# Patient Record
Sex: FEMALE | Race: BLACK OR AFRICAN AMERICAN | Employment: STUDENT | URBAN - METROPOLITAN AREA
[De-identification: names, ages, dates, MRNs, and addresses within clinical notes are randomized per-mention and may not be internally consistent; named-entity substitution may affect disease eponyms.]

---
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
## 2020-02-16 ENCOUNTER — HEALTH MAINTENANCE LETTER (OUTPATIENT)
Age: 20
End: 2020-02-16

## 2020-02-17 NOTE — PATIENT INSTRUCTIONS
General Scheduling Information  To schedule your CT/MRI scan, please contact [place] at [phone]   [address] [place] [address]    To schedule your Surgery, please contact our Specialty Schedulers at [phone]    ENT Clinic Locations Clinic Hours Telephone Number     [facility]  [address] [place] [address]   Tuesday:       8:00am -- 4:00pm    Wednesday:  8:00am - 4:00pm   To schedule an appointment with   Dr. Amezcua,   please contact our   Specialty Scheduling Department at:     [phone]       [facility]  [address] [place] [address]   Friday:          8:00am - 4:00pm         Urgent Care Locations Clinic Hours Telephone Numbers     [facility]  [address] [place] [address]     Monday-Friday:     11:00pm - 9:00pm    Saturday-Sunday:  9:00am - 5:00pm   [phone]     [facility]  [address] [place] [address]     Monday-Friday:      5:00pm - 9:00pm     Saturday-Sunday:  9:00am - 5:00pm   [phone]       
No

## 2020-09-03 ENCOUNTER — TRANSFERRED RECORDS (OUTPATIENT)
Dept: HEALTH INFORMATION MANAGEMENT | Facility: CLINIC | Age: 20
End: 2020-09-03

## 2020-09-14 ENCOUNTER — MEDICAL CORRESPONDENCE (OUTPATIENT)
Dept: HEALTH INFORMATION MANAGEMENT | Facility: CLINIC | Age: 20
End: 2020-09-14

## 2020-09-18 ENCOUNTER — TELEPHONE (OUTPATIENT)
Dept: ORTHOPEDICS | Facility: CLINIC | Age: 20
End: 2020-09-18

## 2020-09-18 ENCOUNTER — TRANSCRIBE ORDERS (OUTPATIENT)
Dept: OTHER | Age: 20
End: 2020-09-18

## 2020-09-18 DIAGNOSIS — R22.31 MASS OF RIGHT UPPER EXTREMITY: Primary | ICD-10-CM

## 2020-09-18 NOTE — TELEPHONE ENCOUNTER
Called pt back. She did not like the time offered for next week and asked to be pushed out another week.     Scheduled.       Brooke

## 2020-09-18 NOTE — TELEPHONE ENCOUNTER
[facility]    Phone Message    May a detailed message be left on voicemail: yes     Reason for Call: Appointment Intake    Referring Provider Name: Dylan Cabrales CNP  Diagnosis and/or Symptoms: Mass of right upper extremity    Action Taken: Message routed to:  [facility] ([facility] 1st available opening is 9/23/20.  Please triage and help pt to get scheduled appropriately    Travel Screening: Not Applicable

## 2020-09-21 NOTE — TELEPHONE ENCOUNTER
RECORDS RECEIVED FROM: Right upper extremity mass   DATE RECEIVED: Oct 2, 2020     NOTES STATUS DETAILS   OFFICE NOTE from referring provider Care Everywhere    OFFICE NOTE from other specialist N/A    DISCHARGE SUMMARY from hospital N/A    DISCHARGE REPORT from the ER N/A    OPERATIVE REPORT N/A    MEDICATION LIST Internal    IMPLANT RECORD/STICKER N/A    LABS     CBC/DIFF N/A    CULTURES N/A    INJECTIONS DONE IN RADIOLOGY N/A    MRI In process    CT SCAN N/A    XRAYS (IMAGES & REPORTS) N/A    TUMOR     PATHOLOGY  Slides & report N/A    09/21/20   10:59 AM   [facility], the images are at [facility], called [facility] they will push images  Malinda Newman CMA

## 2020-10-02 ENCOUNTER — PRE VISIT (OUTPATIENT)
Dept: ORTHOPEDICS | Facility: CLINIC | Age: 20
End: 2020-10-02

## 2020-10-02 ENCOUNTER — OFFICE VISIT (OUTPATIENT)
Dept: ORTHOPEDICS | Facility: CLINIC | Age: 20
End: 2020-10-02
Payer: COMMERCIAL

## 2020-10-02 VITALS — HEIGHT: 69 IN | WEIGHT: 174 LBS | BODY MASS INDEX: 25.77 KG/M2

## 2020-10-02 DIAGNOSIS — R22.31 MASS OF RIGHT UPPER EXTREMITY: ICD-10-CM

## 2020-10-02 DIAGNOSIS — Z11.59 ENCOUNTER FOR SCREENING FOR OTHER VIRAL DISEASES: Primary | ICD-10-CM

## 2020-10-02 PROCEDURE — 99201 PR OFFICE/OUTPT VISIT, NEW, LEVEL I: CPT | Performed by: ORTHOPAEDIC SURGERY

## 2020-10-02 ASSESSMENT — PATIENT HEALTH QUESTIONNAIRE - PHQ9: SUM OF ALL RESPONSES TO PHQ QUESTIONS 1-9: 9

## 2020-10-02 ASSESSMENT — MIFFLIN-ST. JEOR: SCORE: 1613.64

## 2020-10-02 NOTE — PROGRESS NOTES
This is a 20-year-old noticed a right forearm mass in the subcutaneous border the ulna about 4 months ago.  Is gotten larger since she first noticed it.  It is giving her some mild discomfort.  It is bothersome when she is resting her arm on a desk while using her hand to write or type.  She is right-handed.  Its size can fluctuate during the day with it being smallest in the morning.  She is interested in having this removed.  The pain does not radiate and is not associated with any numbness or tingling.    On examination she is alert oriented has a normal mood and affect and is in no acute distress.  Respirations are regular and unlabored eyes are nonicteric.  In her right upper extremity is no edema or erythema.  She has a small firm fixed mass over the subcutaneous border of the right ulna.  Motion of the shoulder elbow wrist and hand are within normal limits.  The hand is well perfused and sensate.    Review the patient's MRI.  The mass is rather indistinct and abutting the ulna within the subcutaneous tissues.  The bone is not involved nor is the skin.  This could be nodular fasciitis or another reactive condition.  It seems to have edema and indistinct boundary or transition between the lesion and normal tissue.    Our plan will be to do an excision of this mass.  The patient understands the risks of bleeding infection and pain.  I have answered all their questions.

## 2020-10-02 NOTE — NURSING NOTE
"Reason For Visit:   Chief Complaint   Patient presents with     Consult     consulting right forearm mass // noticed lump about 4 months ago and slowly growing per pt // also noticed another lump about 1 month ago        Ht 1.753 m (5' 9\")   Wt 78.9 kg (174 lb)   BMI 25.70 kg/m      Pain Assessment  Patient Currently in Pain: Yes  0-10 Pain Scale: 4(only with pressure per)  Primary Pain Location: Arm(right forearm)    Scarlet Gomes ATC    "

## 2020-10-02 NOTE — LETTER
10/2/2020         RE: Shana Ji  [address]  [place] [address]        Dear Colleague,    Thank you for referring your patient, Shana Ji, to the [facility] [place]. Please see a copy of my visit note below.    This is a 20-year-old noticed a right forearm mass in the subcutaneous border the ulna about 4 months ago.  Is gotten larger since she first noticed it.  It is giving her some mild discomfort.  It is bothersome when she is resting her arm on a desk while using her hand to write or type.  She is right-handed.  Its size can fluctuate during the day with it being smallest in the morning.  She is interested in having this removed.  The pain does not radiate and is not associated with any numbness or tingling.    On examination she is alert oriented has a normal mood and affect and is in no acute distress.  Respirations are regular and unlabored eyes are nonicteric.  In her right upper extremity is no edema or erythema.  She has a small firm fixed mass over the subcutaneous border of the right ulna.  Motion of the shoulder elbow wrist and hand are within normal limits.  The hand is well perfused and sensate.    Review the patient's MRI.  The mass is rather indistinct and abutting the ulna within the subcutaneous tissues.  The bone is not involved nor is the skin.  This could be nodular fasciitis or another reactive condition.  It seems to have edema and indistinct boundary or transition between the lesion and normal tissue.    Our plan will be to do an excision of this mass.  The patient understands the risks of bleeding infection and pain.  I have answered all their questions.      Sincerely,        Irineo Aparicio MD

## 2020-10-07 ENCOUNTER — ANESTHESIA EVENT (OUTPATIENT)
Dept: SURGERY | Facility: AMBULATORY SURGERY CENTER | Age: 20
End: 2020-10-07

## 2020-10-07 NOTE — ANESTHESIA PREPROCEDURE EVALUATION
"Anesthesia Pre-Procedure Evaluation    Patient: Shana Ji   MRN:     [id number] Gender:   female   Age:    20 year old :      2000        Preoperative Diagnosis: Mass of right upper extremity [R22.31]   Procedure(s):  Excision right forearm mass     LABS:  CBC:   Lab Results   Component Value Date    WBC 6.2 2016    WBC 5.5 2015    HGB 11.2 (L) 2016    HGB 12.0 2015    HCT 35.0 2016    HCT 36.7 2015     2016     2015     BMP:   Lab Results   Component Value Date     2016     2015    POTASSIUM 3.6 2016    POTASSIUM 4.0 2015    CHLORIDE 104 2016    CHLORIDE 105 2015    CO2 27 2016    CO2 30 2015    BUN 13 2016    BUN 9 2015    CR 0.81 2016    CR 0.75 2015    GLC 94 2016     (H) 2015     COAGS: No results found for: PTT, INR, FIBR  POC: No results found for: BGM, HCG, HCGS  OTHER:   Lab Results   Component Value Date    KIMBERLYN 8.7 (L) 2016    ALBUMIN 4.3 2016    PROTTOTAL 7.7 2016    ALT 13 2016    AST 12 2016    ALKPHOS 86 2016    BILITOTAL 0.4 2016    LIPASE 113 2015    TSH 1.50 2016        Preop Vitals    BP Readings from Last 3 Encounters:   17 124/83   16 109/68   10/07/16 102/71    Pulse Readings from Last 3 Encounters:   16 76   10/07/16 62   16 77      Resp Readings from Last 3 Encounters:   17 16   17 9   16 16    SpO2 Readings from Last 3 Encounters:   17 99%   10/07/16 98%   16 100%      Temp Readings from Last 1 Encounters:   17 36.3  C (97.4  F) (Temporal)    Ht Readings from Last 1 Encounters:   10/02/20 1.753 m (5' 9\")      Wt Readings from Last 1 Encounters:   10/02/20 78.9 kg (174 lb)    Estimated body mass index is 25.7 kg/m  as calculated from the following:    Height as of 10/2/20: 1.753 m (5' 9\").    Weight as of " 10/2/20: 78.9 kg (174 lb).     LDA:        Past Medical History:   Diagnosis Date     Depressive disorder       Past Surgical History:   Procedure Laterality Date     CLOSED REDUCTION NOSE N/A 4/16/2015    Procedure: CLOSED REDUCTION NOSE;  Surgeon: Jamaal Tracey MD;  Location: [facility] OR     TONSILLECTOMY N/A 1/16/2017    Procedure: TONSILLECTOMY;  Surgeon: Dax Amezcua MD;  Location: [facility] OR      No Known Allergies     Anesthesia Evaluation     .             ROS/MED HX    ENT/Pulmonary:      (-) CHRISTIAN risk factors   Neurologic:       Cardiovascular:         METS/Exercise Tolerance:     Hematologic:         Musculoskeletal:         GI/Hepatic:         Renal/Genitourinary:         Endo:         Psychiatric:         Infectious Disease:         Malignancy:         Other:                         PHYSICAL EXAM:   Mental Status/Neuro: A/A/O   Airway: Facies: Feasible  Mallampati: I  Mouth/Opening: Full  TM distance: > 6 cm  Neck ROM: Full   Respiratory:   Resp. Rate: Normal     Resp. Effort: Normal      CV:   Rate: Age appropriate  Edema: None   Comments:      Dental: Normal Dentition                Assessment:   ASA SCORE: 1    H&P: History and physical reviewed and following examination; no interval change.    NPO Status: NPO Appropriate     Plan:   Anes. Type:  MAC   Pre-Medication: None   Induction:  N/a   Airway: Native Airway   Access/Monitoring: PIV   Maintenance: N/a     Postop Plan:   Postop Pain: None  Postop Sedation/Airway: Not planned  Disposition: Outpatient     PONV Management: Adult Risk Factors: Female   Prevention: Ondansetron, Dexamethasone     CONSENT: Direct conversation   Plan and risks discussed with: Patient                      Juma Almeida MD     ANESTHESIA PREOP EVALUATION    PROCEDURE: Procedure(s):  Excision right forearm mass    HPI: Shana Ji is a 20 year old female who presents for above procedure.    PAST MEDICAL HISTORY:    Past Medical History:   Diagnosis Date     Depressive  disorder        PAST SURGICAL HISTORY:    Past Surgical History:   Procedure Laterality Date     CLOSED REDUCTION NOSE N/A 4/16/2015    Procedure: CLOSED REDUCTION NOSE;  Surgeon: Jamaal Tracey MD;  Location: [facility]     TONSILLECTOMY N/A 1/16/2017    Procedure: TONSILLECTOMY;  Surgeon: Dax Amezcua MD;  Location: [facility] OR       SOCIAL HISTORY:       Social History     Tobacco Use     Smoking status: Never Smoker     Smokeless tobacco: Never Used   Substance Use Topics     Alcohol use: No       ALLERGIES:     No Known Allergies    MEDICATIONS:     (Not in a hospital admission)      Current Outpatient Medications   Medication Sig Dispense Refill     Iron TABS Take 65 mg by mouth daily        Melatonin 10 MG TABS Take 10 mg by mouth Per pt take 4 tabs daily       ondansetron (ZOFRAN ODT) 4 MG ODT tab Take 1 tablet (4 mg) by mouth every 6 hours as needed for nausea or vomiting (Patient not taking: Reported on 10/2/2020) 20 tablet 1     oxyCODONE (ROXICODONE) 5 MG/5ML solution Take 5-10 mLs (5-10 mg) by mouth every 4 hours as needed for moderate to severe pain (Patient not taking: Reported on 10/2/2020) 700 mL 0     sertraline (ZOLOFT) 50 MG tablet Take 11/2 pill daily (Patient not taking: Reported on 10/2/2020) 135 tablet 1     UNKNOWN TO PATIENT Birth control pills         Current Outpatient Medications Ordered in Epic   Medication Sig Dispense Refill     Iron TABS Take 65 mg by mouth daily        Melatonin 10 MG TABS Take 10 mg by mouth Per pt take 4 tabs daily       ondansetron (ZOFRAN ODT) 4 MG ODT tab Take 1 tablet (4 mg) by mouth every 6 hours as needed for nausea or vomiting (Patient not taking: Reported on 10/2/2020) 20 tablet 1     oxyCODONE (ROXICODONE) 5 MG/5ML solution Take 5-10 mLs (5-10 mg) by mouth every 4 hours as needed for moderate to severe pain (Patient not taking: Reported on 10/2/2020) 700 mL 0     sertraline (ZOLOFT) 50 MG tablet Take 11/2 pill daily (Patient not taking: Reported on  10/2/2020) 135 tablet 1     UNKNOWN TO PATIENT Birth control pills       No current Epic-ordered facility-administered medications on file.        PHYSICAL EXAM:    Vitals: T Data Unavailable, P Data Unavailable, BP Data Unavailable, R Data Unavailable, SpO2  , Weight   Wt Readings from Last 2 Encounters:   10/02/20 78.9 kg (174 lb)   02/17/17 81.2 kg (179 lb) (95 %, Z= 1.64)*     * Growth percentiles are based on [facility] (Girls, 2-20 Years) data.       See doc flowsheet    NPO STATUS: see doc flowsheet    LABS:    BMP:  Recent Labs   Lab Test 02/19/16  1427      POTASSIUM 3.6   CHLORIDE 104   CO2 27   BUN 13   CR 0.81   GLC 94   KIMBERLYN 8.7*       LFTs:   Recent Labs   Lab Test 02/19/16  1427   PROTTOTAL 7.7   ALBUMIN 4.3   BILITOTAL 0.4   ALKPHOS 86   AST 12   ALT 13       CBC:   Recent Labs   Lab Test 02/19/16  1427   WBC 6.2   RBC 4.16   HGB 11.2*   HCT 35.0   MCV 84   MCH 26.9   MCHC 32.0   RDW 15.1*          Coags:  No results for input(s): INR, PTT, FIBR in the last 76946 hours.    Imaging:  No orders to display       Juma Almeida MD  Anesthesiology Staff  Pager [phone]    10/7/2020  2:20 PM

## 2020-10-08 ENCOUNTER — HOSPITAL ENCOUNTER (OUTPATIENT)
Facility: AMBULATORY SURGERY CENTER | Age: 20
Discharge: HOME | End: 2020-10-08
Attending: ORTHOPAEDIC SURGERY | Admitting: ORTHOPAEDIC SURGERY
Payer: COMMERCIAL

## 2020-10-08 ENCOUNTER — ANESTHESIA (OUTPATIENT)
Dept: SURGERY | Facility: AMBULATORY SURGERY CENTER | Age: 20
End: 2020-10-08

## 2020-10-08 VITALS
WEIGHT: 175 LBS | SYSTOLIC BLOOD PRESSURE: 114 MMHG | DIASTOLIC BLOOD PRESSURE: 71 MMHG | HEART RATE: 74 BPM | RESPIRATION RATE: 16 BRPM | BODY MASS INDEX: 25.05 KG/M2 | HEIGHT: 70 IN | OXYGEN SATURATION: 100 % | TEMPERATURE: 98.3 F

## 2020-10-08 DIAGNOSIS — R22.31 MASS OF RIGHT UPPER EXTREMITY: Primary | ICD-10-CM

## 2020-10-08 LAB
HCG UR QL: NEGATIVE
INTERNAL QC OK POCT: YES

## 2020-10-08 PROCEDURE — 88307 TISSUE EXAM BY PATHOLOGIST: CPT | Mod: GC | Performed by: PATHOLOGY

## 2020-10-08 PROCEDURE — 81025 URINE PREGNANCY TEST: CPT | Performed by: PATHOLOGY

## 2020-10-08 PROCEDURE — 25076 EXC FOREARM TUM DEEP < 3 CM: CPT | Mod: RT,GC | Performed by: ORTHOPAEDIC SURGERY

## 2020-10-08 PROCEDURE — 88342 IMHCHEM/IMCYTCHM 1ST ANTB: CPT | Mod: GC | Performed by: PATHOLOGY

## 2020-10-08 PROCEDURE — 25076 EXC FOREARM TUM DEEP < 3 CM: CPT | Mod: RT

## 2020-10-08 PROCEDURE — 88341 IMHCHEM/IMCYTCHM EA ADD ANTB: CPT | Mod: GC | Performed by: PATHOLOGY

## 2020-10-08 PROCEDURE — 88312 SPECIAL STAINS GROUP 1: CPT | Mod: GC | Performed by: PATHOLOGY

## 2020-10-08 RX ORDER — DEXAMETHASONE SODIUM PHOSPHATE 4 MG/ML
INJECTION, SOLUTION INTRA-ARTICULAR; INTRALESIONAL; INTRAMUSCULAR; INTRAVENOUS; SOFT TISSUE PRN
Status: DISCONTINUED | OUTPATIENT
Start: 2020-10-08 | End: 2020-10-08

## 2020-10-08 RX ORDER — LIDOCAINE 40 MG/G
CREAM TOPICAL
Status: DISCONTINUED | OUTPATIENT
Start: 2020-10-08 | End: 2020-10-08 | Stop reason: HOSPADM

## 2020-10-08 RX ORDER — ACETAMINOPHEN 325 MG/1
975 TABLET ORAL ONCE
Status: COMPLETED | OUTPATIENT
Start: 2020-10-08 | End: 2020-10-08

## 2020-10-08 RX ORDER — KETOROLAC TROMETHAMINE 30 MG/ML
INJECTION, SOLUTION INTRAMUSCULAR; INTRAVENOUS PRN
Status: DISCONTINUED | OUTPATIENT
Start: 2020-10-08 | End: 2020-10-08

## 2020-10-08 RX ORDER — KETOROLAC TROMETHAMINE 30 MG/ML
30 INJECTION, SOLUTION INTRAMUSCULAR; INTRAVENOUS EVERY 6 HOURS PRN
Status: DISCONTINUED | OUTPATIENT
Start: 2020-10-08 | End: 2020-10-09 | Stop reason: HOSPADM

## 2020-10-08 RX ORDER — FENTANYL CITRATE 50 UG/ML
25-50 INJECTION, SOLUTION INTRAMUSCULAR; INTRAVENOUS
Status: DISCONTINUED | OUTPATIENT
Start: 2020-10-08 | End: 2020-10-08 | Stop reason: HOSPADM

## 2020-10-08 RX ORDER — LIDOCAINE HYDROCHLORIDE 20 MG/ML
INJECTION, SOLUTION INFILTRATION; PERINEURAL PRN
Status: DISCONTINUED | OUTPATIENT
Start: 2020-10-08 | End: 2020-10-08

## 2020-10-08 RX ORDER — NALOXONE HYDROCHLORIDE 0.4 MG/ML
.1-.4 INJECTION, SOLUTION INTRAMUSCULAR; INTRAVENOUS; SUBCUTANEOUS
Status: DISCONTINUED | OUTPATIENT
Start: 2020-10-08 | End: 2020-10-09 | Stop reason: HOSPADM

## 2020-10-08 RX ORDER — OXYCODONE HYDROCHLORIDE 5 MG/1
5 TABLET ORAL EVERY 6 HOURS PRN
Qty: 12 TABLET | Refills: 0 | Status: SHIPPED | OUTPATIENT
Start: 2020-10-08 | End: 2020-10-11

## 2020-10-08 RX ORDER — BUPIVACAINE HYDROCHLORIDE 2.5 MG/ML
INJECTION, SOLUTION INFILTRATION; PERINEURAL PRN
Status: DISCONTINUED | OUTPATIENT
Start: 2020-10-08 | End: 2020-10-08 | Stop reason: HOSPADM

## 2020-10-08 RX ORDER — MEPERIDINE HYDROCHLORIDE 25 MG/ML
12.5 INJECTION INTRAMUSCULAR; INTRAVENOUS; SUBCUTANEOUS
Status: DISCONTINUED | OUTPATIENT
Start: 2020-10-08 | End: 2020-10-09 | Stop reason: HOSPADM

## 2020-10-08 RX ORDER — SODIUM CHLORIDE, SODIUM LACTATE, POTASSIUM CHLORIDE, CALCIUM CHLORIDE 600; 310; 30; 20 MG/100ML; MG/100ML; MG/100ML; MG/100ML
INJECTION, SOLUTION INTRAVENOUS CONTINUOUS
Status: DISCONTINUED | OUTPATIENT
Start: 2020-10-08 | End: 2020-10-08 | Stop reason: HOSPADM

## 2020-10-08 RX ORDER — CEFAZOLIN SODIUM 1 G/50ML
1 SOLUTION INTRAVENOUS SEE ADMIN INSTRUCTIONS
Status: DISCONTINUED | OUTPATIENT
Start: 2020-10-08 | End: 2020-10-08 | Stop reason: HOSPADM

## 2020-10-08 RX ORDER — PROPOFOL 10 MG/ML
INJECTION, EMULSION INTRAVENOUS CONTINUOUS PRN
Status: DISCONTINUED | OUTPATIENT
Start: 2020-10-08 | End: 2020-10-08

## 2020-10-08 RX ORDER — PROPOFOL 10 MG/ML
INJECTION, EMULSION INTRAVENOUS PRN
Status: DISCONTINUED | OUTPATIENT
Start: 2020-10-08 | End: 2020-10-08

## 2020-10-08 RX ORDER — ONDANSETRON 2 MG/ML
4 INJECTION INTRAMUSCULAR; INTRAVENOUS EVERY 30 MIN PRN
Status: DISCONTINUED | OUTPATIENT
Start: 2020-10-08 | End: 2020-10-09 | Stop reason: HOSPADM

## 2020-10-08 RX ORDER — CEFAZOLIN SODIUM 2 G/50ML
2 SOLUTION INTRAVENOUS
Status: COMPLETED | OUTPATIENT
Start: 2020-10-08 | End: 2020-10-08

## 2020-10-08 RX ORDER — SODIUM CHLORIDE, SODIUM LACTATE, POTASSIUM CHLORIDE, CALCIUM CHLORIDE 600; 310; 30; 20 MG/100ML; MG/100ML; MG/100ML; MG/100ML
INJECTION, SOLUTION INTRAVENOUS CONTINUOUS
Status: DISCONTINUED | OUTPATIENT
Start: 2020-10-08 | End: 2020-10-09 | Stop reason: HOSPADM

## 2020-10-08 RX ORDER — OXYCODONE HYDROCHLORIDE 5 MG/1
5 TABLET ORAL EVERY 4 HOURS PRN
Status: DISCONTINUED | OUTPATIENT
Start: 2020-10-08 | End: 2020-10-09 | Stop reason: HOSPADM

## 2020-10-08 RX ORDER — ONDANSETRON 4 MG/1
4 TABLET, ORALLY DISINTEGRATING ORAL EVERY 30 MIN PRN
Status: DISCONTINUED | OUTPATIENT
Start: 2020-10-08 | End: 2020-10-09 | Stop reason: HOSPADM

## 2020-10-08 RX ORDER — FENTANYL CITRATE 50 UG/ML
INJECTION, SOLUTION INTRAMUSCULAR; INTRAVENOUS PRN
Status: DISCONTINUED | OUTPATIENT
Start: 2020-10-08 | End: 2020-10-08

## 2020-10-08 RX ORDER — ONDANSETRON 2 MG/ML
INJECTION INTRAMUSCULAR; INTRAVENOUS PRN
Status: DISCONTINUED | OUTPATIENT
Start: 2020-10-08 | End: 2020-10-08

## 2020-10-08 RX ORDER — GABAPENTIN 300 MG/1
300 CAPSULE ORAL ONCE
Status: COMPLETED | OUTPATIENT
Start: 2020-10-08 | End: 2020-10-08

## 2020-10-08 RX ADMIN — GABAPENTIN 300 MG: 300 CAPSULE ORAL at 07:43

## 2020-10-08 RX ADMIN — FENTANYL CITRATE 25 MCG: 50 INJECTION, SOLUTION INTRAMUSCULAR; INTRAVENOUS at 09:32

## 2020-10-08 RX ADMIN — KETOROLAC TROMETHAMINE 15 MG: 30 INJECTION, SOLUTION INTRAMUSCULAR; INTRAVENOUS at 09:55

## 2020-10-08 RX ADMIN — LIDOCAINE HYDROCHLORIDE 50 MG: 20 INJECTION, SOLUTION INFILTRATION; PERINEURAL at 09:29

## 2020-10-08 RX ADMIN — CEFAZOLIN SODIUM 2 G: 2 SOLUTION INTRAVENOUS at 09:33

## 2020-10-08 RX ADMIN — PROPOFOL 30 MG: 10 INJECTION, EMULSION INTRAVENOUS at 09:41

## 2020-10-08 RX ADMIN — FENTANYL CITRATE 25 MCG: 50 INJECTION, SOLUTION INTRAMUSCULAR; INTRAVENOUS at 09:42

## 2020-10-08 RX ADMIN — ACETAMINOPHEN 975 MG: 325 TABLET ORAL at 07:43

## 2020-10-08 RX ADMIN — ONDANSETRON 4 MG: 2 INJECTION INTRAMUSCULAR; INTRAVENOUS at 09:35

## 2020-10-08 RX ADMIN — PROPOFOL 60 MG: 10 INJECTION, EMULSION INTRAVENOUS at 09:29

## 2020-10-08 RX ADMIN — SODIUM CHLORIDE, SODIUM LACTATE, POTASSIUM CHLORIDE, CALCIUM CHLORIDE: 600; 310; 30; 20 INJECTION, SOLUTION INTRAVENOUS at 07:49

## 2020-10-08 RX ADMIN — PROPOFOL 150 MCG/KG/MIN: 10 INJECTION, EMULSION INTRAVENOUS at 09:30

## 2020-10-08 RX ADMIN — DEXAMETHASONE SODIUM PHOSPHATE 4 MG: 4 INJECTION, SOLUTION INTRA-ARTICULAR; INTRALESIONAL; INTRAMUSCULAR; INTRAVENOUS; SOFT TISSUE at 09:31

## 2020-10-08 ASSESSMENT — MIFFLIN-ST. JEOR: SCORE: 1634.04

## 2020-10-08 NOTE — ANESTHESIA POSTPROCEDURE EVALUATION
Anesthesia POST Procedure Evaluation    Patient: Shana Ji   MRN:     [id number] Gender:   female   Age:    20 year old :      2000        Preoperative Diagnosis: Mass of right upper extremity [R22.31]   Procedure(s):  Excision right forearm mass   Postop Comments: No value filed.     Anesthesia Type: MAC       Disposition: Outpatient   Postop Pain Control: Uneventful            Sign Out: Well controlled pain   PONV: No   Neuro/Psych: Uneventful            Sign Out: Acceptable/Baseline neuro status   Airway/Respiratory: Uneventful            Sign Out: Acceptable/Baseline resp. status   CV/Hemodynamics: Uneventful            Sign Out: Acceptable CV status   Other NRE: NONE   DID A NON-ROUTINE EVENT OCCUR? No         Last Anesthesia Record Vitals:  CRNA VITALS  10/8/2020 0936 - 10/8/2020 1036      10/8/2020             Pulse:  69    SpO2:  98 %    Resp Rate (set):  10          Last PACU Vitals:  No vitals data found for the desired time range.        Electronically Signed By: Juma Almeida MD, 2020, 1:24 PM

## 2020-10-08 NOTE — ANESTHESIA CARE TRANSFER NOTE
Patient: Shana Lambert    Procedure(s):  Excision right forearm mass    Diagnosis: Mass of right upper extremity [R22.31]  Diagnosis Additional Information: No value filed.    Anesthesia Type:   MAC     Note:  Airway :Room Air  Patient transferred to:Phase II  Comments: 131/76  HR: 73  SpO2: 98%  RR: 13  T: 99.6    Report to RN. Handoff Report: Identifed the Patient, Identified the Reponsible Provider, Reviewed the pertinent medical history, Discussed the surgical course, Reviewed Intra-OP anesthesia mangement and issues during anesthesia, Set expectations for post-procedure period and Allowed opportunity for questions and acknowledgement of understanding      Vitals: (Last set prior to Anesthesia Care Transfer)    CRNA VITALS  10/8/2020 0936 - 10/8/2020 1012      10/8/2020             Pulse:  69    SpO2:  98 %    Resp Rate (set):  10                Electronically Signed By: VICKY Edwards CRNA  October 8, 2020  10:12 AM

## 2020-10-08 NOTE — OP NOTE
DATE OF SURGERY: 10/8/2020    PREOPERATIVE DIAGNOSIS: Right forearm mass    POSTOPERATIVE DIAGNOSIS: Right forearm mass    PROCEDURE: Excision right forearm mass, 2.5 cm    SURGEON: Irineo Aparicio MD     ASSISTANT: Mk Gunn MD    PATIENT HISTORY: This patient has a mass on her right forearm that has not gone away after many months.  It is bothersome when she rests her arm on something.  She understands the risks of bleeding infection and pain.  Examining the mass that it is fixed to the underlying fascia.  However mostly involves the subcutaneous fat.    DESCRIPTION OF PROCEDURE: The patient was placed supine and underwent successful induction of anesthesia.  The right arm was washed and sterilely prepped and draped.  I made an incision over the arm sharply through the skin divided subcutaneous tissues with cautery until we came down to this dense connective tissue that formed a mass.  I  this with cautery from the surrounding subcutaneous tissue and took a little bit of the deep fascia with this.  I did not take periosteum but was able to  from the periosteum.  It was fairly adherent to the periosteal tissues however.  After we excised it it was sent to pathology in formalin.  We copiously irrigated and closed with Vicryl to subcutaneous tissue Monocryl in the skin Steri-Strips and a sterile dry dressing.  The patient was then taken to recovery room in stable condition.  The estimated blood loss is 2 mL.  There were no complications.  I was present for all critical portions of the procedure.    Irineo Aparicio MD

## 2020-10-08 NOTE — DISCHARGE INSTRUCTIONS
"[facility]  Home Care Following Anesthesia  For 24 hours after surgery:  1. Get plenty of rest.  A responsible adult must stay with you for at least 24 hours after you leave the surgery center.  2. Do not drive or use heavy equipment.  If you have weakness or tingling, don't drive or use heavy equipment until this feeling goes away.   3. Do not drink alcohol.   4. Avoid strenuous or risky activities.  Ask for help when climbing stairs.  5. You may feel lightheaded.  IF so, sit for a few minutes before standing.  Have someone help you get up.   6. If you have nausea (feel sick to your stomach): Drink only clear liquids such as apple juice, ginger ale, broth or 7-Up.  Rest may also help.  Be sure to drink enough fluids.  Move to a regular diet as you feel able.   7. You may have a slight fever.  Call the doctor if your fever is over 100 F (37.7 C) (taken under the tongue) or lasts longer than 24 hours.  8. You may have a dry mouth, a sore throat, muscle aches or trouble sleeping. These should go away after 24 hours.  9. Do not make important or legal decisions.        Today you received a Marcaine or bupivacaine block to numb the nerves near your surgery site.  This is a block using local anesthetic or \"numbing\" medication injected around the nerves to anesthetize or \"numb\" the area supplied by those nerves.  This block is injected into the muscle layer near your surgical site.  The medication may numb the location where you had surgery for 6-18 hours, but may last up to 24 hours.  If your surgical site is an arm or leg you should be careful with your affected limb, since it is possible to injure your limb without being aware of it due to the numbing.  Until full feeling returns, you should guard against bumping or hitting your limb, and avoid extreme hot or cold temperatures on the skin.  As the block wears off, the feeling will return as a tingling or prickly sensation near your " surgical site.  You will experience more discomfort from your incision as the feeling returns.  You may want to take a pain pill (a narcotic or Tylenol if this was prescribed by your surgeon) when you start to experience mild pain before the pain beccomes more severe.  If your pain medications do not control your pain you should notifiy your surgeon.    Tips for taking pain medications  To get the best pain relief possible, remember these points:    Take pain medications as directed, before pain becomes severe.    Pain medication can upset your stomach: taking it with food may help.    Constipation is a common side effect of pain medication. Drink plenty of  fluids.    Eat foods high in fiber. Take a stool softener if recommended by your doctor or pharmacist.    Do not drink alcohol, drive or operate machinery while taking pain medications.    Ask about other ways to control pain, such as with heat, ice or relaxation.    Tylenol/Acetaminophen Consumption  To help encourage the safe use of acetaminophen, the makers of TYLENOL  have lowered the maximum daily dose for single-ingredient Extra Strength TYLENOL  (acetaminophen) products sold in the [place] from 8 pills per day (4,000 mg) to 6 pills per day (3,000 mg). The dosing interval has also changed from 2 pills every 4-6 hours to 2 pills every 6 hours.    If you feel your pain relief is insufficient, you may take Tylenol/Acetaminophen in addition to your narcotic pain medication.     Be careful not to exceed 3,000 mg of Tylenol/Acetaminophen in a 24 hour period from all sources.    If you are taking extra strength Tylenol/acetaminophen (500 mg), the maximum dose is 6 tablets in 24 hours.    If you are taking regular strength acetaminophen (325 mg), the maximum dose is 9 tablets in 24 hours.    Tylenol 975mg given at 7:43am. Next dose available after 1:45pm. Then follow package instructions.     Call a doctor for any of the followin. Signs of infection (fever,  growing tenderness at the surgery site, a large amount of drainage or bleeding, severe pain, foul-smelling drainage, redness, swelling).  2. It has been over 8 to 10 hours since surgery and you are still not able to urinate (pass water).  3. Headache for over 24 hours.  4. Numbness, tingling or weakness the day after surgery (if you had spinal anesthesia).  5. Signs of Covid-19 infection (temperature over 100 degrees, shortness of breath, cough, loss of taste/smell, generalized body aches, persistent headache, chills, sore throat, nausea/vomiting/diarrhea)  Your doctor is:       Dr. Irineo Aparicio, Orthopaedics: [phone]               Or dial [phone] and ask for the resident on call for:  Orthopaedics  For emergency care, call the:  [facility] Emergency Department: [phone] (TTY for hearing impaired: [phone])

## 2020-10-12 LAB — COPATH REPORT: NORMAL

## 2020-10-20 ENCOUNTER — TELEPHONE (OUTPATIENT)
Dept: ORTHOPEDICS | Facility: CLINIC | Age: 20
End: 2020-10-20

## 2020-10-20 NOTE — TELEPHONE ENCOUNTER
RN called and spoke with Laine.  She is doing well, no concerns about her incision.  She has a post op visit on Friday.    Irineo Villarreal MD [facility] Center[facility]ion[facility]aitlyn maldonado RN             Can you please let this patient know that she has this benign tumor and this likely really anti-inflammatory condition.  I doubt it will regrow.  She may return to see me as needed as long as her incision is healing well.  Thank you.    Associated Results    Surgical pathology exam  Order: [id number]  Status:  Final result   Visible to patient:  Yes (MyChart)  Component 12d ago   Copath Report Patient Name: LAINE MCCRACKEN   MR#: [id number]   Specimen #: [id number]   Collected: 10/8/2020   Received: 10/8/2020   Reported: 10/12/2020 16:54   Ordering Phy(s): IRINEO VILLARREAL     For improved result formatting, select 'View Enhanced Report Format' under    Linked Documents section.     SPECIMEN(S):   Soft tissue mass, right forearm mass     FINAL DIAGNOSIS:   Soft tissue mass, right forearm, biopsy:   - Deep granuloma annulare   - Negative for malignancy

## 2020-10-23 ENCOUNTER — VIRTUAL VISIT (OUTPATIENT)
Dept: ORTHOPEDICS | Facility: CLINIC | Age: 20
End: 2020-10-23
Payer: COMMERCIAL

## 2020-10-23 DIAGNOSIS — L92.0 GRANULOMA ANNULARE: Primary | ICD-10-CM

## 2020-10-23 PROCEDURE — 99024 POSTOP FOLLOW-UP VISIT: CPT | Mod: 95 | Performed by: ORTHOPAEDIC SURGERY

## 2020-10-23 NOTE — PROGRESS NOTES
"Reason For Visit:   Chief Complaint   Patient presents with     Surgical Followup     2 wk pop DOS Right forearm mass excision        There were no vitals taken for this visit.    Pain Assessment  Patient Currently in Pain: No    Effie Parker, MAY      Shana Ji is a 20 year old female who is being evaluated via a billable video visit.      The patient has been notified of following:     \"This video visit will be conducted via a call between you and your physician/provider. We have found that certain health care needs can be provided without the need for an in-person physical exam.  This service lets us provide the care you need with a video conversation.  If a prescription is necessary we can send it directly to your pharmacy.  If lab work is needed we can place an order for that and you can then stop by our lab to have the test done at a later time.    Video visits are billed at different rates depending on your insurance coverage.  Please reach out to your insurance provider with any questions.    If during the course of the call the physician/provider feels a video visit is not appropriate, you will not be charged for this service.\"    Patient has given verbal consent for Video visit? Yes  How would you like to obtain your AVS? MyChart  If you are dropped from the video visit, the video invite should be resent to: Text to cell phone: [phone]  Will anyone else be joining your video visit? No        Video-Visit Details    Type of service:  Video Visit    Video Start Time: 1:02 PM  Video End Time: 1:08 PM    Originating Location (pt. Location): Home    Distant Location (provider location):  [facility]     Platform used for Video Visit: Tru     This 20-year-old is status post excision of a granuloma annulare off of her right forearm.  The incision is healing well with no erythema or swelling.  She would like to resume full activities and I have given her the " okay to resume unrestricted activities.  She realizes that this is unlikely to regrow also is unlikely to show up elsewhere in her body but if these things happen she can return to see us.  I told her that we consider this an inflammatory condition and she needs to be mindful of other inflammatory reactions that she might have however there is no special treatment for this right now beyond what she has already had.  I have answered all of her questions.  She will return to see us as needed.    Irineo Aparicio MD

## 2020-10-23 NOTE — LETTER
"    10/23/2020         RE: Shana Ji  [address]  [place] [address]        Dear Colleague,    Thank you for referring your patient, Shana Ji, to the [facility] [place]. Please see a copy of my visit note below.    Reason For Visit:   Chief Complaint   Patient presents with     Surgical Followup     2 wk pop DOS Right forearm mass excision        There were no vitals taken for this visit.    Pain Assessment  Patient Currently in Pain: No    Effie Parker, MAY      Shana Ji is a 20 year old female who is being evaluated via a billable video visit.      The patient has been notified of following:     \"This video visit will be conducted via a call between you and your physician/provider. We have found that certain health care needs can be provided without the need for an in-person physical exam.  This service lets us provide the care you need with a video conversation.  If a prescription is necessary we can send it directly to your pharmacy.  If lab work is needed we can place an order for that and you can then stop by our lab to have the test done at a later time.    Video visits are billed at different rates depending on your insurance coverage.  Please reach out to your insurance provider with any questions.    If during the course of the call the physician/provider feels a video visit is not appropriate, you will not be charged for this service.\"    Patient has given verbal consent for Video visit? Yes  How would you like to obtain your AVS? MyChart  If you are dropped from the video visit, the video invite should be resent to: Text to cell phone: [phone]  Will anyone else be joining your video visit? No        Video-Visit Details    Type of service:  Video Visit    Video Start Time: 1:02 PM  Video End Time: 1:08 PM    Originating Location (pt. Location): Home    Distant Location (provider location):  [facility] " [employer]     Platform used for Video Visit: Tru     This 20-year-old is status post excision of a granuloma annulare off of her right forearm.  The incision is healing well with no erythema or swelling.  She would like to resume full activities and I have given her the okay to resume unrestricted activities.  She realizes that this is unlikely to regrow also is unlikely to show up elsewhere in her body but if these things happen she can return to see us.  I told her that we consider this an inflammatory condition and she needs to be mindful of other inflammatory reactions that she might have however there is no special treatment for this right now beyond what she has already had.  I have answered all of her questions.  She will return to see us as needed.    Irineo Aparicio MD

## 2020-11-22 ENCOUNTER — HEALTH MAINTENANCE LETTER (OUTPATIENT)
Age: 20
End: 2020-11-22

## 2021-04-04 ENCOUNTER — HEALTH MAINTENANCE LETTER (OUTPATIENT)
Age: 21
End: 2021-04-04

## 2021-09-18 ENCOUNTER — HEALTH MAINTENANCE LETTER (OUTPATIENT)
Age: 21
End: 2021-09-18

## 2022-04-30 ENCOUNTER — HEALTH MAINTENANCE LETTER (OUTPATIENT)
Age: 22
End: 2022-04-30

## 2022-11-20 ENCOUNTER — HEALTH MAINTENANCE LETTER (OUTPATIENT)
Age: 22
End: 2022-11-20

## 2023-06-02 ENCOUNTER — HEALTH MAINTENANCE LETTER (OUTPATIENT)
Age: 23
End: 2023-06-02

## (undated) DEVICE — PREP POVIDONE-IODINE 7.5% SCRUB 4OZ BOTTLE MDS093945

## (undated) DEVICE — MARKER SKIN DOUBLE TIP W/FLEXI-RULER W/LABELS

## (undated) DEVICE — SUCTION MANIFOLD NEPTUNE 2 SYS 1 PORT 702-025-000

## (undated) DEVICE — ANTIFOG SOLUTION W/FOAM PAD CF-1001

## (undated) DEVICE — SPECIMEN CONTAINER 5OZ STERILE 2600SA

## (undated) DEVICE — BASIN SET MINOR DISP

## (undated) DEVICE — PREP DURAPREP REMOVER 4OZ 8611

## (undated) DEVICE — PACK SET-UP STD 9102

## (undated) DEVICE — DRSG AQUACEL AG 3.5X6.0" HYDROFIBER 412010

## (undated) DEVICE — DRAPE STERI TOWEL LG 1010

## (undated) DEVICE — ESU PENCIL W/HOLSTER

## (undated) DEVICE — GLOVE PROTEXIS POWDER FREE SMT 7.0  2D72PT70X

## (undated) DEVICE — SOL NACL 0.9% IRRIG 1000ML BOTTLE 2F7124

## (undated) DEVICE — TUBING SUCTION 10'X3/16" N510

## (undated) DEVICE — ESU GROUND PAD ADULT W/CORD E7507

## (undated) DEVICE — SUCTION TIP YANKAUER W/O VENT K86

## (undated) DEVICE — SU SILK 2-0 PS 18" 1588H

## (undated) DEVICE — SU VICRYL 2-0 SH 27" UND J417H

## (undated) DEVICE — GOWN XLG DISP 9545

## (undated) DEVICE — DRSG STERI STRIP 1/2X4" R1547

## (undated) DEVICE — PACK UNIVERSAL SPLIT 29131

## (undated) DEVICE — SYR EAR BULB 3OZ 0035830

## (undated) DEVICE — ESU ELEC NDL 1" COATED/INSULATED E1465

## (undated) DEVICE — ESU SUCTION CAUTERY 10FR FOOT CONTROL E2505-10FR

## (undated) DEVICE — DRAPE SHEET HALF 40X60" 9358

## (undated) DEVICE — CAST PADDING 4" STERILE 9044S

## (undated) DEVICE — GLOVE PROTEXIS W/NEU-THERA 7.0  2D73TE70

## (undated) DEVICE — PACK HAND CUSTOM ASC

## (undated) DEVICE — GLOVE PROTEXIS BLUE W/NEU-THERA 7.5  2D73EB75

## (undated) DEVICE — CATH INTERMITTENT CLEAN-CATH 8FR 16" VINYL LF 421708

## (undated) DEVICE — GLOVE PROTEXIS W/NEU-THERA 7.5  2D73TE75

## (undated) DEVICE — PREP DURAPREP 26ML APL 8630

## (undated) DEVICE — LINEN ORTHO PACK 5446

## (undated) RX ORDER — ACETAMINOPHEN 325 MG/1
TABLET ORAL
Status: DISPENSED
Start: 2020-10-08

## (undated) RX ORDER — DEXAMETHASONE SODIUM PHOSPHATE 4 MG/ML
INJECTION, SOLUTION INTRA-ARTICULAR; INTRALESIONAL; INTRAMUSCULAR; INTRAVENOUS; SOFT TISSUE
Status: DISPENSED
Start: 2020-10-08

## (undated) RX ORDER — FENTANYL CITRATE 50 UG/ML
INJECTION, SOLUTION INTRAMUSCULAR; INTRAVENOUS
Status: DISPENSED
Start: 2020-10-08

## (undated) RX ORDER — GABAPENTIN 300 MG/1
CAPSULE ORAL
Status: DISPENSED
Start: 2020-10-08

## (undated) RX ORDER — BUPIVACAINE HYDROCHLORIDE 2.5 MG/ML
INJECTION, SOLUTION EPIDURAL; INFILTRATION; INTRACAUDAL
Status: DISPENSED
Start: 2020-10-08

## (undated) RX ORDER — CEFAZOLIN SODIUM 1 G/3ML
INJECTION, POWDER, FOR SOLUTION INTRAMUSCULAR; INTRAVENOUS
Status: DISPENSED
Start: 2020-10-08

## (undated) RX ORDER — ONDANSETRON 2 MG/ML
INJECTION INTRAMUSCULAR; INTRAVENOUS
Status: DISPENSED
Start: 2020-10-08

## (undated) RX ORDER — PROPOFOL 10 MG/ML
INJECTION, EMULSION INTRAVENOUS
Status: DISPENSED
Start: 2020-10-08